# Patient Record
Sex: FEMALE | Race: WHITE | NOT HISPANIC OR LATINO | Employment: UNEMPLOYED | URBAN - METROPOLITAN AREA
[De-identification: names, ages, dates, MRNs, and addresses within clinical notes are randomized per-mention and may not be internally consistent; named-entity substitution may affect disease eponyms.]

---

## 2018-06-11 ENCOUNTER — OFFICE VISIT (OUTPATIENT)
Dept: URGENT CARE | Facility: PHYSICIAN GROUP | Age: 28
End: 2018-06-11
Payer: COMMERCIAL

## 2018-06-11 VITALS
WEIGHT: 180 LBS | DIASTOLIC BLOOD PRESSURE: 78 MMHG | BODY MASS INDEX: 36.29 KG/M2 | HEART RATE: 78 BPM | SYSTOLIC BLOOD PRESSURE: 122 MMHG | RESPIRATION RATE: 18 BRPM | TEMPERATURE: 98.7 F | HEIGHT: 59 IN | OXYGEN SATURATION: 97 %

## 2018-06-11 DIAGNOSIS — J02.9 VIRAL PHARYNGITIS: ICD-10-CM

## 2018-06-11 DIAGNOSIS — J02.9 SORE THROAT: ICD-10-CM

## 2018-06-11 LAB
INT CON NEG: NEGATIVE
INT CON POS: POSITIVE
S PYO AG THROAT QL: NORMAL

## 2018-06-11 PROCEDURE — 87880 STREP A ASSAY W/OPTIC: CPT | Performed by: PHYSICIAN ASSISTANT

## 2018-06-11 PROCEDURE — 99203 OFFICE O/P NEW LOW 30 MIN: CPT | Performed by: PHYSICIAN ASSISTANT

## 2018-06-11 ASSESSMENT — ENCOUNTER SYMPTOMS
TROUBLE SWALLOWING: 1
FEVER: 1
MUSCULOSKELETAL NEGATIVE: 1
GASTROINTESTINAL NEGATIVE: 1
SWOLLEN GLANDS: 1
DIZZINESS: 0
CHILLS: 0
HEADACHES: 1
SHORTNESS OF BREATH: 0
WHEEZING: 0
COUGH: 0
SORE THROAT: 1
CARDIOVASCULAR NEGATIVE: 1

## 2018-06-11 NOTE — PROGRESS NOTES
"Subjective:      Liz Rider is a 27 y.o. female who presents with Pharyngitis (x3 days )            Pharyngitis    This is a new problem. The current episode started in the past 7 days. The problem has been gradually worsening. The maximum temperature recorded prior to her arrival was 101 - 101.9 F. The fever has been present for 1 to 2 days. Associated symptoms include congestion, ear pain, headaches, swollen glands and trouble swallowing. Pertinent negatives include no coughing or shortness of breath. She has tried NSAIDs for the symptoms. The treatment provided mild relief.       PMH:  has a past medical history of Psoriasis.  MEDS:   Current Outpatient Prescriptions:   •  Levonorgestrel-Ethinyl Estrad (LUTERA PO), Take  by mouth., Disp: , Rfl:   •  TRIAMCINOLONE ACETONIDE, TOP, 0.05 % OINT, 1 Application by Apply externally route 2 Times a Day., Disp: 1 g, Rfl: 3  ALLERGIES: No Known Allergies  SURGHX:   Past Surgical History:   Procedure Laterality Date   • OPEN REDUCTION       SOCHX:  reports that she has been smoking Cigarettes.  She has never used smokeless tobacco. She reports that she drinks alcohol. She reports that she does not use drugs.  FH: family history is not on file.    Review of Systems   Constitutional: Positive for fever. Negative for chills.   HENT: Positive for congestion, ear pain, sore throat and trouble swallowing.    Respiratory: Negative for cough, shortness of breath and wheezing.    Cardiovascular: Negative.    Gastrointestinal: Negative.    Musculoskeletal: Negative.    Neurological: Positive for headaches. Negative for dizziness.       Medications, Allergies, and current problem list reviewed today in Epic     Objective:     /78   Pulse 78   Temp 37.1 °C (98.7 °F)   Resp 18   Ht 1.499 m (4' 11\")   Wt 81.6 kg (180 lb)   SpO2 97%   BMI 36.36 kg/m²      Physical Exam   Constitutional: She is oriented to person, place, and time. She appears well-developed and " well-nourished. No distress.   HENT:   Head: Normocephalic and atraumatic.   Right Ear: Tympanic membrane and external ear normal.   Left Ear: Tympanic membrane and external ear normal.   Nose: Nose normal.   Mouth/Throat: Oropharynx is clear and moist. No oropharyngeal exudate.   Eyes: Conjunctivae and EOM are normal. Pupils are equal, round, and reactive to light. Right eye exhibits no discharge. Left eye exhibits no discharge.   Neck: Normal range of motion. Neck supple.   Cardiovascular: Normal rate, regular rhythm and normal heart sounds.    Pulmonary/Chest: Effort normal and breath sounds normal. No respiratory distress. She has no wheezes.   Musculoskeletal: Normal range of motion.   Lymphadenopathy:     She has no cervical adenopathy.   Neurological: She is alert and oriented to person, place, and time.   Skin: Skin is warm and dry. She is not diaphoretic.   Psychiatric: She has a normal mood and affect. Her behavior is normal. Judgment and thought content normal.   Nursing note and vitals reviewed.              Assessment/Plan:     1. Sore throat  POCT Rapid Strep A   2. Viral pharyngitis  lidocaine viscous 2% (XYLOCAINE) 2 % Solution     Rapid strep: Negative  No signs of bacterial infection. Vitals normal, rapid strep negative. Likely a viral illness.  Symptomatic relief with viscous lidocaine  OTC meds and conservative measures as discussed  Return to clinic or go to ED if symptoms worsen or persist. Indications for ED discussed at length. Patient voices understanding. Follow-up with your primary care provider in 3-5 days. Red flags discussed. All side effects of medication discussed including allergic response, GI upset, tendon injury, etc.    Please note that this dictation was created using voice recognition software. I have made every reasonable attempt to correct obvious errors, but I expect that there are errors of grammar and possibly content that I did not discover before finalizing the note.

## 2018-12-29 ENCOUNTER — OFFICE VISIT (OUTPATIENT)
Dept: URGENT CARE | Facility: PHYSICIAN GROUP | Age: 28
End: 2018-12-29
Payer: COMMERCIAL

## 2018-12-29 ENCOUNTER — HOSPITAL ENCOUNTER (OUTPATIENT)
Facility: MEDICAL CENTER | Age: 28
End: 2018-12-29
Attending: PHYSICIAN ASSISTANT
Payer: COMMERCIAL

## 2018-12-29 VITALS
OXYGEN SATURATION: 96 % | BODY MASS INDEX: 37.01 KG/M2 | DIASTOLIC BLOOD PRESSURE: 76 MMHG | WEIGHT: 183.6 LBS | SYSTOLIC BLOOD PRESSURE: 128 MMHG | HEART RATE: 80 BPM | TEMPERATURE: 98.5 F | HEIGHT: 59 IN

## 2018-12-29 DIAGNOSIS — N61.0 CELLULITIS OF RIGHT BREAST: ICD-10-CM

## 2018-12-29 PROCEDURE — 99214 OFFICE O/P EST MOD 30 MIN: CPT | Performed by: FAMILY MEDICINE

## 2018-12-29 PROCEDURE — 87205 SMEAR GRAM STAIN: CPT

## 2018-12-29 PROCEDURE — 87070 CULTURE OTHR SPECIMN AEROBIC: CPT

## 2018-12-29 RX ORDER — SULFAMETHOXAZOLE AND TRIMETHOPRIM 800; 160 MG/1; MG/1
1 TABLET ORAL 2 TIMES DAILY
Qty: 20 TAB | Refills: 0 | Status: SHIPPED | OUTPATIENT
Start: 2018-12-29 | End: 2019-01-08

## 2018-12-29 ASSESSMENT — ENCOUNTER SYMPTOMS
HEADACHES: 0
DIAPHORESIS: 0
FEVER: 0
MYALGIAS: 0
CONSTIPATION: 0
CHILLS: 0
DIARRHEA: 0
DIZZINESS: 0
NAUSEA: 0
VOMITING: 0
ABDOMINAL PAIN: 0
SHORTNESS OF BREATH: 0
FLANK PAIN: 0

## 2018-12-29 NOTE — PROGRESS NOTES
Subjective:   Liz Rider is a 28 y.o. female who presents for Nipple Problem (lump, tender, bruising, painful to the touch, discharge (clear/yellow), x4 days)       Patient presents today with right breast swelling and redness for 4-5 days. She states that she had her nipples pierced in April and had no complications. Within the last 4-5 days, she has developed redness, swelling, pain, and yellow discharge from the piercing site. She denies fever, chills, nausea, vomiting, diarrhea. She is having difficulty sleeping at night because any pressure to the area causes pain.       Review of Systems   Constitutional: Negative for chills, diaphoresis and fever.   Respiratory: Negative for shortness of breath.    Cardiovascular: Negative for chest pain.   Gastrointestinal: Negative for abdominal pain, constipation, diarrhea, nausea and vomiting.   Genitourinary: Negative for dysuria, flank pain, frequency and urgency.   Musculoskeletal: Negative for joint pain and myalgias.   Skin:        Redness, swelling, pain, discharge of right breast.   Neurological: Negative for dizziness and headaches.       PMH:  has a past medical history of Psoriasis.    MEDS:   Current Outpatient Prescriptions:   •  sulfamethoxazole-trimethoprim (BACTRIM DS) 800-160 MG tablet, Take 1 Tab by mouth 2 times a day for 10 days., Disp: 20 Tab, Rfl: 0  •  lidocaine viscous 2% (XYLOCAINE) 2 % Solution, Take 5 mL by mouth 3 times a day as needed for Throat/Mouth Pain., Disp: 100 mL, Rfl: 0  •  Levonorgestrel-Ethinyl Estrad (LUTERA PO), Take  by mouth., Disp: , Rfl:   •  TRIAMCINOLONE ACETONIDE, TOP, 0.05 % OINT, 1 Application by Apply externally route 2 Times a Day., Disp: 1 g, Rfl: 3    ALLERGIES: No Known Allergies    SURGHX:   Past Surgical History:   Procedure Laterality Date   • OPEN REDUCTION         SOCHX:  reports that she has been smoking Cigarettes.  She has never used smokeless tobacco. She reports that she drinks alcohol. She reports  "that she does not use drugs.    FH: Reviewed with patient, not pertinent to this visit.     Objective:   /76 (BP Location: Right arm, Patient Position: Sitting, BP Cuff Size: Adult)   Pulse 80   Temp 36.9 °C (98.5 °F) (Temporal)   Ht 1.499 m (4' 11\")   Wt 83.3 kg (183 lb 9.6 oz)   SpO2 96%   BMI 37.08 kg/m²   Physical Exam   Constitutional: She is oriented to person, place, and time. She appears well-developed and well-nourished. No distress.   HENT:   Head: Normocephalic and atraumatic.   Right Ear: External ear normal.   Left Ear: External ear normal.   Nose: Nose normal.   Eyes: Conjunctivae and EOM are normal.   Neck: No tracheal deviation present.   Cardiovascular: Normal rate, regular rhythm and normal heart sounds.    Pulmonary/Chest: Effort normal and breath sounds normal. No respiratory distress.   Neurological: She is alert and oriented to person, place, and time.   Skin: Skin is warm and dry.   Erythema, warmth, edema of right breast around nipple. Scant yellow discharge present along piercing. Tender to palpation.   Psychiatric: She has a normal mood and affect. Her behavior is normal. Judgment and thought content normal.       Assessment/Plan:   1. Cellulitis of right breast  - sulfamethoxazole-trimethoprim (BACTRIM DS) 800-160 MG tablet; Take 1 Tab by mouth 2 times a day for 10 days.  Dispense: 20 Tab; Refill: 0  - CULTURE WOUND W/ GRAM STAIN; Future  - Advised to take abx with food, yogurt and to complete course  - Advised to take OTC Ibuprofen/Acetaminophen prn  - Will call patient with culture results and treatment change if indicated  - Advised to return/go to ED if symptoms of infection worsen or do not improve    Differential diagnosis, natural history, supportive care, and indications for immediate follow-up discussed.  Patient's case was reviewed and discussed with Dr. Queen during Nabil BORDEN's training period.    "

## 2018-12-30 LAB
GRAM STN SPEC: NORMAL
SIGNIFICANT IND 70042: NORMAL
SITE SITE: NORMAL
SOURCE SOURCE: NORMAL

## 2018-12-31 LAB
BACTERIA WND AEROBE CULT: NORMAL
GRAM STN SPEC: NORMAL
SIGNIFICANT IND 70042: NORMAL
SITE SITE: NORMAL
SOURCE SOURCE: NORMAL

## 2019-01-11 ENCOUNTER — OFFICE VISIT (OUTPATIENT)
Dept: URGENT CARE | Facility: PHYSICIAN GROUP | Age: 29
End: 2019-01-11
Payer: COMMERCIAL

## 2019-01-11 VITALS
HEIGHT: 59 IN | BODY MASS INDEX: 36.29 KG/M2 | WEIGHT: 180 LBS | DIASTOLIC BLOOD PRESSURE: 72 MMHG | TEMPERATURE: 98.1 F | SYSTOLIC BLOOD PRESSURE: 128 MMHG | HEART RATE: 68 BPM | RESPIRATION RATE: 16 BRPM | OXYGEN SATURATION: 99 %

## 2019-01-11 DIAGNOSIS — N63.0 BREAST MASS IN FEMALE: Primary | ICD-10-CM

## 2019-01-11 LAB
INT CON NEG: NEGATIVE
INT CON POS: POSITIVE
POC URINE PREGNANCY TEST: NEGATIVE

## 2019-01-11 PROCEDURE — 81025 URINE PREGNANCY TEST: CPT | Performed by: PHYSICIAN ASSISTANT

## 2019-01-11 PROCEDURE — 99214 OFFICE O/P EST MOD 30 MIN: CPT | Performed by: PHYSICIAN ASSISTANT

## 2019-01-11 ASSESSMENT — ENCOUNTER SYMPTOMS
CONSTIPATION: 0
COUGH: 0
FATIGUE: 0
BREAST PAIN: 1
SHORTNESS OF BREATH: 0
ABDOMINAL PAIN: 0
ANOREXIA: 0
VOMITING: 0
BACK PAIN: 0
CHILLS: 0
DIARRHEA: 0
CHANGE IN BOWEL HABIT: 0
FEVER: 0
SWOLLEN GLANDS: 0
NAUSEA: 0

## 2019-01-11 NOTE — PROGRESS NOTES
Subjective:   Liz Rider is a 28 y.o. female who presents for Breast Pain (bump on right breast, painful )        Breast Pain   This is a new problem. Episode onset: 1 month. The problem occurs constantly. The problem has been unchanged. Pertinent negatives include no abdominal pain, anorexia, change in bowel habit, chills, congestion, coughing, fatigue, fever, nausea, swollen glands, urinary symptoms or vomiting. Nothing aggravates the symptoms. She has tried NSAIDs for the symptoms. The treatment provided mild relief.     Pt is not currently pregnant. No hx of PCOs. . Not currently on BCP. Currently sexually active.     LMP: 1/3/19, normal. Pain did not change with menses.     Pt was seen on 18, tx for cellulitis with 10 day course of bactrim. Sx improved. Sx recurred 1 day after completed course of antibiotic.  Redness in area has resolved.    Review of Systems   Constitutional: Negative for chills, fatigue, fever and malaise/fatigue.   HENT: Negative for congestion.    Respiratory: Negative for cough and shortness of breath.    Gastrointestinal: Negative for abdominal pain, anorexia, change in bowel habit, constipation, diarrhea, nausea and vomiting.   Musculoskeletal: Negative for back pain and joint pain.   All other systems reviewed and are negative.      PMH:  has a past medical history of Psoriasis.  MEDS:   Current Outpatient Prescriptions:   •  lidocaine viscous 2% (XYLOCAINE) 2 % Solution, Take 5 mL by mouth 3 times a day as needed for Throat/Mouth Pain., Disp: 100 mL, Rfl: 0  •  Levonorgestrel-Ethinyl Estrad (LUTERA PO), Take  by mouth., Disp: , Rfl:   •  TRIAMCINOLONE ACETONIDE, TOP, 0.05 % OINT, 1 Application by Apply externally route 2 Times a Day., Disp: 1 g, Rfl: 3  ALLERGIES: No Known Allergies  SURGHX:   Past Surgical History:   Procedure Laterality Date   • OPEN REDUCTION       SOCHX:  reports that she has quit smoking. Her smoking use included Cigarettes. She has never used  "smokeless tobacco. She reports that she drinks alcohol. She reports that she does not use drugs.  No family history on file.     Objective:   /72   Pulse 68   Temp 36.7 °C (98.1 °F) (Temporal)   Resp 16   Ht 1.499 m (4' 11\")   Wt 81.6 kg (180 lb)   SpO2 99%   BMI 36.36 kg/m²     Physical Exam   Constitutional: She is oriented to person, place, and time. She appears well-developed and well-nourished. No distress.   HENT:   Head: Normocephalic and atraumatic.   Eyes: Pupils are equal, round, and reactive to light. Conjunctivae are normal.   Cardiovascular: Normal rate and regular rhythm.    Pulmonary/Chest: Effort normal and breath sounds normal.       Neurological: She is alert and oriented to person, place, and time.   Skin: Skin is warm and dry.   Psychiatric: She has a normal mood and affect. Her behavior is normal.   Vitals reviewed.    HCG negative      Assessment/Plan:     1. Breast mass in female  US-BREAST COMPLETE-RIGHT    POCT Pregnancy    REFERRAL TO FOLLOW-UP WITH PRIMARY CARE    REFERRAL TO GYNECOLOGY     Patient will be notified of final ultrasound results.  Continue to monitor area closely.  She can continue ibuprofen as 100-800 mg as needed for pain.  We discussed removing metal nipple ring.     Follow-up with primary care provider within 7-10 days, referral placed.  If symptoms worsen or persist patient can contact me or return to clinic for follow-up.    A thorough review of red flags and strict emergency room precautions discussed.  Patient verbalizes understanding of information.       "

## 2019-01-14 ENCOUNTER — HOSPITAL ENCOUNTER (OUTPATIENT)
Dept: RADIOLOGY | Facility: MEDICAL CENTER | Age: 29
End: 2019-01-14
Attending: PHYSICIAN ASSISTANT
Payer: COMMERCIAL

## 2019-01-14 DIAGNOSIS — N63.0 BREAST MASS IN FEMALE: ICD-10-CM

## 2019-01-14 PROCEDURE — 76642 ULTRASOUND BREAST LIMITED: CPT | Mod: RT

## 2019-01-14 NOTE — PROGRESS NOTES
Covering for Jayna who is not working today.  Received a call from radiologist Dr. Olmos regarding breast ultrasound results.  Appears to be a abscess of the breast.  She is recommending a referral to the breast center for surgical consult.  I will placed that referral now.  Results were discussed with patient at time of consultation.    Impression       4.7 cm maximal diameter complex fluid and solid echo collection in the right retroareolar region which given the patient history has an appearance most consistent with abscess or phlegmon. No large easily drainable fluid collection is appreciated.   Surgical referral is recommended to better determine whether the area needs to be excised.    These results and need for surgical referral were discussed with Melvi from [facility] at 1540 hours on 1/14/2019.  These results were given to the patient at the time of visit.    Category 4A - Finding needing surgical consultation but with a low suspicion for malignancy.     1. Breast mass in female  REFERRAL TO BREAST CLINIC         Rex Rider P.A.-C.

## 2019-01-15 DIAGNOSIS — N63.0 BREAST MASS: ICD-10-CM

## 2019-01-18 ENCOUNTER — HOSPITAL ENCOUNTER (INPATIENT)
Dept: HOSPITAL 8 - OUT | Age: 29
LOS: 3 days | Discharge: HOME | DRG: 585 | End: 2019-01-21
Attending: SURGERY | Admitting: SURGERY
Payer: COMMERCIAL

## 2019-01-18 VITALS — SYSTOLIC BLOOD PRESSURE: 122 MMHG | DIASTOLIC BLOOD PRESSURE: 66 MMHG

## 2019-01-18 VITALS — WEIGHT: 190.04 LBS | HEIGHT: 59 IN | BODY MASS INDEX: 38.31 KG/M2

## 2019-01-18 VITALS — SYSTOLIC BLOOD PRESSURE: 100 MMHG | DIASTOLIC BLOOD PRESSURE: 51 MMHG

## 2019-01-18 VITALS — SYSTOLIC BLOOD PRESSURE: 122 MMHG | DIASTOLIC BLOOD PRESSURE: 70 MMHG

## 2019-01-18 DIAGNOSIS — N61.1: Primary | ICD-10-CM

## 2019-01-18 DIAGNOSIS — Z83.3: ICD-10-CM

## 2019-01-18 LAB — HCG UR SG: 1.01 (ref 1–1.03)

## 2019-01-18 PROCEDURE — 0HBT0ZZ EXCISION OF RIGHT BREAST, OPEN APPROACH: ICD-10-PCS | Performed by: SURGERY

## 2019-01-18 PROCEDURE — 88305 TISSUE EXAM BY PATHOLOGIST: CPT

## 2019-01-18 PROCEDURE — 87070 CULTURE OTHR SPECIMN AEROBIC: CPT

## 2019-01-18 PROCEDURE — 87181 SC STD AGAR DILUTION PER AGT: CPT

## 2019-01-18 PROCEDURE — 87075 CULTR BACTERIA EXCEPT BLOOD: CPT

## 2019-01-18 PROCEDURE — 81025 URINE PREGNANCY TEST: CPT

## 2019-01-18 PROCEDURE — 87205 SMEAR GRAM STAIN: CPT

## 2019-01-18 RX ADMIN — HYDROMORPHONE HYDROCHLORIDE PRN MG: 2 INJECTION INTRAMUSCULAR; INTRAVENOUS; SUBCUTANEOUS at 15:13

## 2019-01-18 RX ADMIN — FENTANYL CITRATE PRN MCG: 50 INJECTION INTRAMUSCULAR; INTRAVENOUS at 14:57

## 2019-01-18 RX ADMIN — SODIUM CHLORIDE, PRESERVATIVE FREE SCH ML: 5 INJECTION INTRAVENOUS at 21:00

## 2019-01-18 RX ADMIN — MORPHINE SULFATE PRN MG: 10 INJECTION INTRAVENOUS at 16:59

## 2019-01-18 RX ADMIN — HYDROMORPHONE HYDROCHLORIDE PRN MG: 2 INJECTION INTRAMUSCULAR; INTRAVENOUS; SUBCUTANEOUS at 15:00

## 2019-01-18 RX ADMIN — OXYCODONE HYDROCHLORIDE AND ACETAMINOPHEN PRN TAB: 5; 325 TABLET ORAL at 19:30

## 2019-01-18 RX ADMIN — MORPHINE SULFATE PRN MG: 10 INJECTION INTRAVENOUS at 17:22

## 2019-01-18 RX ADMIN — FENTANYL CITRATE PRN MCG: 50 INJECTION INTRAMUSCULAR; INTRAVENOUS at 14:48

## 2019-01-18 RX ADMIN — DOCUSATE SODIUM SCH MG: 100 CAPSULE, LIQUID FILLED ORAL at 21:32

## 2019-01-18 RX ADMIN — AMOXICILLIN AND CLAVULANATE POTASSIUM SCH TAB: 875; 125 TABLET, FILM COATED ORAL at 17:22

## 2019-01-19 VITALS — DIASTOLIC BLOOD PRESSURE: 50 MMHG | SYSTOLIC BLOOD PRESSURE: 93 MMHG

## 2019-01-19 VITALS — DIASTOLIC BLOOD PRESSURE: 73 MMHG | SYSTOLIC BLOOD PRESSURE: 126 MMHG

## 2019-01-19 VITALS — SYSTOLIC BLOOD PRESSURE: 113 MMHG | DIASTOLIC BLOOD PRESSURE: 76 MMHG

## 2019-01-19 VITALS — DIASTOLIC BLOOD PRESSURE: 60 MMHG | SYSTOLIC BLOOD PRESSURE: 109 MMHG

## 2019-01-19 RX ADMIN — SODIUM CHLORIDE, PRESERVATIVE FREE SCH ML: 5 INJECTION INTRAVENOUS at 09:42

## 2019-01-19 RX ADMIN — OXYCODONE HYDROCHLORIDE AND ACETAMINOPHEN PRN TAB: 5; 325 TABLET ORAL at 14:06

## 2019-01-19 RX ADMIN — OXYCODONE HYDROCHLORIDE AND ACETAMINOPHEN PRN TAB: 5; 325 TABLET ORAL at 10:12

## 2019-01-19 RX ADMIN — AMOXICILLIN AND CLAVULANATE POTASSIUM SCH TAB: 875; 125 TABLET, FILM COATED ORAL at 18:03

## 2019-01-19 RX ADMIN — DOCUSATE SODIUM SCH MG: 100 CAPSULE, LIQUID FILLED ORAL at 21:21

## 2019-01-19 RX ADMIN — OXYCODONE HYDROCHLORIDE AND ACETAMINOPHEN PRN TAB: 5; 325 TABLET ORAL at 18:03

## 2019-01-19 RX ADMIN — OXYCODONE HYDROCHLORIDE AND ACETAMINOPHEN PRN TAB: 5; 325 TABLET ORAL at 21:54

## 2019-01-19 RX ADMIN — OXYCODONE HYDROCHLORIDE AND ACETAMINOPHEN PRN TAB: 5; 325 TABLET ORAL at 05:42

## 2019-01-19 RX ADMIN — MORPHINE SULFATE PRN MG: 10 INJECTION INTRAVENOUS at 09:42

## 2019-01-19 RX ADMIN — MORPHINE SULFATE PRN MG: 10 INJECTION INTRAVENOUS at 21:21

## 2019-01-19 RX ADMIN — SODIUM CHLORIDE, PRESERVATIVE FREE SCH ML: 5 INJECTION INTRAVENOUS at 21:21

## 2019-01-19 RX ADMIN — AMOXICILLIN AND CLAVULANATE POTASSIUM SCH TAB: 875; 125 TABLET, FILM COATED ORAL at 05:38

## 2019-01-19 RX ADMIN — OXYCODONE HYDROCHLORIDE AND ACETAMINOPHEN PRN TAB: 5; 325 TABLET ORAL at 01:13

## 2019-01-19 RX ADMIN — DOCUSATE SODIUM SCH MG: 100 CAPSULE, LIQUID FILLED ORAL at 09:42

## 2019-01-19 RX ADMIN — ONDANSETRON PRN MG: 2 INJECTION, SOLUTION INTRAMUSCULAR; INTRAVENOUS at 09:42

## 2019-01-19 RX ADMIN — ONDANSETRON PRN MG: 2 INJECTION, SOLUTION INTRAMUSCULAR; INTRAVENOUS at 21:20

## 2019-01-20 VITALS — DIASTOLIC BLOOD PRESSURE: 71 MMHG | SYSTOLIC BLOOD PRESSURE: 113 MMHG

## 2019-01-20 VITALS — DIASTOLIC BLOOD PRESSURE: 53 MMHG | SYSTOLIC BLOOD PRESSURE: 102 MMHG

## 2019-01-20 VITALS — SYSTOLIC BLOOD PRESSURE: 96 MMHG | DIASTOLIC BLOOD PRESSURE: 65 MMHG

## 2019-01-20 VITALS — SYSTOLIC BLOOD PRESSURE: 107 MMHG | DIASTOLIC BLOOD PRESSURE: 63 MMHG

## 2019-01-20 RX ADMIN — ONDANSETRON PRN MG: 2 INJECTION, SOLUTION INTRAMUSCULAR; INTRAVENOUS at 10:02

## 2019-01-20 RX ADMIN — OXYCODONE HYDROCHLORIDE AND ACETAMINOPHEN PRN TAB: 5; 325 TABLET ORAL at 21:29

## 2019-01-20 RX ADMIN — ONDANSETRON PRN MG: 2 INJECTION, SOLUTION INTRAMUSCULAR; INTRAVENOUS at 22:13

## 2019-01-20 RX ADMIN — AMOXICILLIN AND CLAVULANATE POTASSIUM SCH TAB: 875; 125 TABLET, FILM COATED ORAL at 05:04

## 2019-01-20 RX ADMIN — MORPHINE SULFATE PRN MG: 10 INJECTION INTRAVENOUS at 22:13

## 2019-01-20 RX ADMIN — ONDANSETRON PRN MG: 2 INJECTION, SOLUTION INTRAMUSCULAR; INTRAVENOUS at 16:54

## 2019-01-20 RX ADMIN — DOCUSATE SODIUM SCH MG: 100 CAPSULE, LIQUID FILLED ORAL at 10:02

## 2019-01-20 RX ADMIN — SODIUM CHLORIDE, PRESERVATIVE FREE SCH ML: 5 INJECTION INTRAVENOUS at 21:29

## 2019-01-20 RX ADMIN — MORPHINE SULFATE PRN MG: 10 INJECTION INTRAVENOUS at 10:02

## 2019-01-20 RX ADMIN — AMOXICILLIN AND CLAVULANATE POTASSIUM SCH TAB: 875; 125 TABLET, FILM COATED ORAL at 17:00

## 2019-01-20 RX ADMIN — DOCUSATE SODIUM SCH MG: 100 CAPSULE, LIQUID FILLED ORAL at 21:29

## 2019-01-20 RX ADMIN — SODIUM CHLORIDE, PRESERVATIVE FREE SCH ML: 5 INJECTION INTRAVENOUS at 10:03

## 2019-01-20 RX ADMIN — OXYCODONE HYDROCHLORIDE AND ACETAMINOPHEN PRN TAB: 5; 325 TABLET ORAL at 10:25

## 2019-01-21 VITALS — SYSTOLIC BLOOD PRESSURE: 93 MMHG | DIASTOLIC BLOOD PRESSURE: 52 MMHG

## 2019-01-21 VITALS — DIASTOLIC BLOOD PRESSURE: 57 MMHG | SYSTOLIC BLOOD PRESSURE: 105 MMHG

## 2019-01-21 RX ADMIN — DOCUSATE SODIUM SCH MG: 100 CAPSULE, LIQUID FILLED ORAL at 08:20

## 2019-01-21 RX ADMIN — AMOXICILLIN AND CLAVULANATE POTASSIUM SCH TAB: 875; 125 TABLET, FILM COATED ORAL at 05:37

## 2019-01-21 RX ADMIN — SODIUM CHLORIDE, PRESERVATIVE FREE SCH ML: 5 INJECTION INTRAVENOUS at 08:20

## 2019-01-21 RX ADMIN — OXYCODONE HYDROCHLORIDE AND ACETAMINOPHEN PRN TAB: 5; 325 TABLET ORAL at 09:17

## 2019-06-17 ENCOUNTER — HOSPITAL ENCOUNTER (OUTPATIENT)
Facility: MEDICAL CENTER | Age: 29
End: 2019-06-17
Attending: PHYSICIAN ASSISTANT
Payer: COMMERCIAL

## 2019-06-17 ENCOUNTER — OFFICE VISIT (OUTPATIENT)
Dept: URGENT CARE | Facility: PHYSICIAN GROUP | Age: 29
End: 2019-06-17
Payer: COMMERCIAL

## 2019-06-17 VITALS
DIASTOLIC BLOOD PRESSURE: 70 MMHG | WEIGHT: 190 LBS | OXYGEN SATURATION: 98 % | TEMPERATURE: 98.6 F | HEIGHT: 59 IN | BODY MASS INDEX: 38.3 KG/M2 | HEART RATE: 74 BPM | SYSTOLIC BLOOD PRESSURE: 122 MMHG | RESPIRATION RATE: 16 BRPM

## 2019-06-17 DIAGNOSIS — B97.89 VIRAL SORE THROAT: ICD-10-CM

## 2019-06-17 DIAGNOSIS — J02.8 VIRAL SORE THROAT: ICD-10-CM

## 2019-06-17 DIAGNOSIS — J06.9 VIRAL URI WITH COUGH: ICD-10-CM

## 2019-06-17 LAB
INT CON NEG: NEGATIVE
INT CON POS: POSITIVE
S PYO AG THROAT QL: NORMAL

## 2019-06-17 PROCEDURE — 87880 STREP A ASSAY W/OPTIC: CPT | Performed by: PHYSICIAN ASSISTANT

## 2019-06-17 PROCEDURE — 99213 OFFICE O/P EST LOW 20 MIN: CPT | Performed by: PHYSICIAN ASSISTANT

## 2019-06-17 PROCEDURE — 87070 CULTURE OTHR SPECIMN AEROBIC: CPT

## 2019-06-17 ASSESSMENT — ENCOUNTER SYMPTOMS
FEVER: 0
MYALGIAS: 0
HEADACHES: 0
HOARSE VOICE: 1
VOMITING: 0
TROUBLE SWALLOWING: 1
NAUSEA: 0
EYE REDNESS: 0
EYE DISCHARGE: 0
COUGH: 1
SHORTNESS OF BREATH: 0
ABDOMINAL PAIN: 0
SWOLLEN GLANDS: 1
SORE THROAT: 1

## 2019-06-17 NOTE — PROGRESS NOTES
Subjective:      Liz Rider is a 29 y.o. female who presents with Sore Throat (swollen tonsils, feels like choking on tonsils X 1 week )          Pharyngitis    This is a new problem. Episode onset: x7-8 days. The problem has been unchanged. The pain is worse on the right side. There has been no fever. The pain is moderate. Associated symptoms include congestion (now improved), coughing (now improved), ear pain (right ear), a hoarse voice, swollen glands and trouble swallowing (The patient reports increased pain with swallowing). Pertinent negatives include no abdominal pain, drooling, headaches, shortness of breath or vomiting. She has had no exposure to strep. She has tried NSAIDs (OTC Sore Throat Medication) for the symptoms. The treatment provided mild relief.     The patient presents to clinic c/o sore throat x 7-8 days. The patient states she thought she had a viral cold with associated cough and congestion, however the cough and congestion has since improved. No fever.     PMH:  has a past medical history of Psoriasis.  MEDS:   Current Outpatient Prescriptions:   •  lidocaine viscous 2% (XYLOCAINE) 2 % Solution, Take 5 mL by mouth 3 times a day as needed for Throat/Mouth Pain., Disp: 100 mL, Rfl: 0  •  Levonorgestrel-Ethinyl Estrad (LUTERA PO), Take  by mouth., Disp: , Rfl:   •  TRIAMCINOLONE ACETONIDE, TOP, 0.05 % OINT, 1 Application by Apply externally route 2 Times a Day., Disp: 1 g, Rfl: 3  ALLERGIES: No Known Allergies  SURGHX:   Past Surgical History:   Procedure Laterality Date   • OPEN REDUCTION       SOCHX:  reports that she has quit smoking. Her smoking use included Cigarettes. She has never used smokeless tobacco. She reports that she drinks alcohol. She reports that she does not use drugs.  FH: Family history was reviewed, no pertinent findings to report    Review of Systems   Constitutional: Negative for fever.   HENT: Positive for congestion (now improved), ear pain (right ear), hoarse  "voice, sore throat and trouble swallowing (The patient reports increased pain with swallowing). Negative for drooling.    Eyes: Negative for discharge and redness.   Respiratory: Positive for cough (now improved). Negative for shortness of breath.    Cardiovascular: Negative for chest pain and leg swelling.   Gastrointestinal: Negative for abdominal pain, nausea and vomiting.   Genitourinary: Negative for dysuria.   Musculoskeletal: Negative for joint pain and myalgias.   Skin: Negative for rash.   Neurological: Negative for headaches.          Objective:     /70   Pulse 74   Temp 37 °C (98.6 °F)   Resp 16   Ht 1.499 m (4' 11\")   Wt 86.2 kg (190 lb)   SpO2 98%   BMI 38.38 kg/m²      Physical Exam   Constitutional: She is oriented to person, place, and time. She appears well-developed and well-nourished. No distress.   HENT:   Head: Normocephalic and atraumatic.   Right Ear: Tympanic membrane, external ear and ear canal normal.   Left Ear: Tympanic membrane, external ear and ear canal normal.   Nose: Nose normal.   Mouth/Throat: Mucous membranes are normal. Posterior oropharyngeal erythema present. Tonsils are 1+ on the right. Tonsils are 1+ on the left. No tonsillar exudate.   Eyes: Conjunctivae and EOM are normal.   Neck: Normal range of motion. Neck supple.   Cardiovascular: Normal rate, regular rhythm and normal heart sounds.    Pulmonary/Chest: Effort normal and breath sounds normal.   Musculoskeletal: Normal range of motion. She exhibits no edema.   Neurological: She is alert and oriented to person, place, and time.   Skin: Skin is warm and dry.          Progress:  POCT Rapid Strep: Negative    Throat Culture - pending     Assessment/Plan:     1. Viral sore throat    - POCT Rapid Strep A  - CULTURE THROAT; Future    2. Viral URI with cough    The patient's presenting symptoms and physical exam are consistent with a viral sore throat and a viral URI with associated cough. Her rapid strep test was " negative today in clinic, indicating her symptoms are unlikely due to strep pharyngitis. Will culture the patent's throat to rule out other bacterial sources. Given the patient is currently not experiencing a productive cough, fever, or shortness of breath, and the presence of clear lung sounds and a normal POX on physical exam, it is unlikely her symptoms are due to a lower respiratory infection. Recommend OTC medications and supportive care for symptomatic relief.  Discussed return precautions with the patient, and she verbalized understanding.      OTC Tylenol or Motrin for fever/discomfort.  OTC cough/cold medications for symptomatic relief  OTC Supportive Care for Sore Throat - warm salt water gargles, sore throat lozenges, warm lemon water, and/or tea.  Drink plenty of fluids  Follow-up with PCP as needed   Return to clinic or go to the ED if symptoms worsen or fail to improve, or if patient develops worsening/increasing sore throat, difficultly handling secretion, change in voice, ear pain, cough, congestion, shortness of breath, fever/chils, and/or any concerning symptoms.     Discussed plan with the patient, and she agrees to the above.

## 2019-06-20 LAB
BACTERIA SPEC RESP CULT: NORMAL
SIGNIFICANT IND 70042: NORMAL
SITE SITE: NORMAL
SOURCE SOURCE: NORMAL

## 2019-09-03 ENCOUNTER — HOSPITAL ENCOUNTER (EMERGENCY)
Facility: MEDICAL CENTER | Age: 29
End: 2019-09-03
Attending: EMERGENCY MEDICINE
Payer: MEDICAID

## 2019-09-03 ENCOUNTER — APPOINTMENT (OUTPATIENT)
Dept: RADIOLOGY | Facility: MEDICAL CENTER | Age: 29
End: 2019-09-03
Attending: EMERGENCY MEDICINE
Payer: MEDICAID

## 2019-09-03 ENCOUNTER — OFFICE VISIT (OUTPATIENT)
Dept: URGENT CARE | Facility: CLINIC | Age: 29
End: 2019-09-03
Payer: MEDICAID

## 2019-09-03 VITALS
OXYGEN SATURATION: 94 % | TEMPERATURE: 96.6 F | BODY MASS INDEX: 39.11 KG/M2 | HEIGHT: 59 IN | WEIGHT: 194 LBS | RESPIRATION RATE: 16 BRPM | SYSTOLIC BLOOD PRESSURE: 115 MMHG | HEART RATE: 66 BPM | DIASTOLIC BLOOD PRESSURE: 61 MMHG

## 2019-09-03 VITALS
SYSTOLIC BLOOD PRESSURE: 118 MMHG | HEIGHT: 59 IN | WEIGHT: 190 LBS | BODY MASS INDEX: 38.3 KG/M2 | HEART RATE: 87 BPM | RESPIRATION RATE: 18 BRPM | OXYGEN SATURATION: 98 % | TEMPERATURE: 99.1 F | DIASTOLIC BLOOD PRESSURE: 76 MMHG

## 2019-09-03 DIAGNOSIS — R22.1 NECK MASS: Primary | ICD-10-CM

## 2019-09-03 DIAGNOSIS — L02.11 ABSCESS, NECK: ICD-10-CM

## 2019-09-03 LAB
ANION GAP SERPL CALC-SCNC: 10 MMOL/L (ref 0–11.9)
BASOPHILS # BLD AUTO: 0.5 % (ref 0–1.8)
BASOPHILS # BLD: 0.06 K/UL (ref 0–0.12)
BUN SERPL-MCNC: 9 MG/DL (ref 8–22)
CALCIUM SERPL-MCNC: 9.8 MG/DL (ref 8.5–10.5)
CHLORIDE SERPL-SCNC: 104 MMOL/L (ref 96–112)
CO2 SERPL-SCNC: 25 MMOL/L (ref 20–33)
CREAT SERPL-MCNC: 0.74 MG/DL (ref 0.5–1.4)
EOSINOPHIL # BLD AUTO: 0.21 K/UL (ref 0–0.51)
EOSINOPHIL NFR BLD: 1.7 % (ref 0–6.9)
ERYTHROCYTE [DISTWIDTH] IN BLOOD BY AUTOMATED COUNT: 41.3 FL (ref 35.9–50)
GLUCOSE SERPL-MCNC: 89 MG/DL (ref 65–99)
GRAM STN SPEC: NORMAL
HCG SERPL QL: NEGATIVE
HCT VFR BLD AUTO: 43.3 % (ref 37–47)
HGB BLD-MCNC: 14.3 G/DL (ref 12–16)
IMM GRANULOCYTES # BLD AUTO: 0.05 K/UL (ref 0–0.11)
IMM GRANULOCYTES NFR BLD AUTO: 0.4 % (ref 0–0.9)
LYMPHOCYTES # BLD AUTO: 1.75 K/UL (ref 1–4.8)
LYMPHOCYTES NFR BLD: 14.4 % (ref 22–41)
MCH RBC QN AUTO: 29.2 PG (ref 27–33)
MCHC RBC AUTO-ENTMCNC: 33 G/DL (ref 33.6–35)
MCV RBC AUTO: 88.4 FL (ref 81.4–97.8)
MONOCYTES # BLD AUTO: 0.79 K/UL (ref 0–0.85)
MONOCYTES NFR BLD AUTO: 6.5 % (ref 0–13.4)
NEUTROPHILS # BLD AUTO: 9.29 K/UL (ref 2–7.15)
NEUTROPHILS NFR BLD: 76.5 % (ref 44–72)
NRBC # BLD AUTO: 0 K/UL
NRBC BLD-RTO: 0 /100 WBC
PLATELET # BLD AUTO: 290 K/UL (ref 164–446)
PMV BLD AUTO: 9.2 FL (ref 9–12.9)
POTASSIUM SERPL-SCNC: 4.1 MMOL/L (ref 3.6–5.5)
RBC # BLD AUTO: 4.9 M/UL (ref 4.2–5.4)
SIGNIFICANT IND 70042: NORMAL
SITE SITE: NORMAL
SODIUM SERPL-SCNC: 139 MMOL/L (ref 135–145)
SOURCE SOURCE: NORMAL
WBC # BLD AUTO: 12.2 K/UL (ref 4.8–10.8)

## 2019-09-03 PROCEDURE — 700101 HCHG RX REV CODE 250: Performed by: EMERGENCY MEDICINE

## 2019-09-03 PROCEDURE — 84703 CHORIONIC GONADOTROPIN ASSAY: CPT

## 2019-09-03 PROCEDURE — 85025 COMPLETE CBC W/AUTO DIFF WBC: CPT

## 2019-09-03 PROCEDURE — 700117 HCHG RX CONTRAST REV CODE 255: Performed by: EMERGENCY MEDICINE

## 2019-09-03 PROCEDURE — 70491 CT SOFT TISSUE NECK W/DYE: CPT

## 2019-09-03 PROCEDURE — 60300 ASPIR/INJ THYROID CYST: CPT

## 2019-09-03 PROCEDURE — 87205 SMEAR GRAM STAIN: CPT

## 2019-09-03 PROCEDURE — 99284 EMERGENCY DEPT VISIT MOD MDM: CPT

## 2019-09-03 PROCEDURE — 10160 PNXR ASPIR ABSC HMTMA BULLA: CPT

## 2019-09-03 PROCEDURE — 96365 THER/PROPH/DIAG IV INF INIT: CPT | Mod: XU

## 2019-09-03 PROCEDURE — 87040 BLOOD CULTURE FOR BACTERIA: CPT

## 2019-09-03 PROCEDURE — 99214 OFFICE O/P EST MOD 30 MIN: CPT | Performed by: PHYSICIAN ASSISTANT

## 2019-09-03 PROCEDURE — 87070 CULTURE OTHR SPECIMN AEROBIC: CPT | Mod: XU

## 2019-09-03 PROCEDURE — 700105 HCHG RX REV CODE 258: Performed by: EMERGENCY MEDICINE

## 2019-09-03 PROCEDURE — 700111 HCHG RX REV CODE 636 W/ 250 OVERRIDE (IP): Performed by: EMERGENCY MEDICINE

## 2019-09-03 PROCEDURE — 80048 BASIC METABOLIC PNL TOTAL CA: CPT

## 2019-09-03 RX ORDER — LIDOCAINE HYDROCHLORIDE AND EPINEPHRINE 10; 10 MG/ML; UG/ML
10 INJECTION, SOLUTION INFILTRATION; PERINEURAL ONCE
Status: COMPLETED | OUTPATIENT
Start: 2019-09-03 | End: 2019-09-03

## 2019-09-03 RX ORDER — AMOXICILLIN AND CLAVULANATE POTASSIUM 875; 125 MG/1; MG/1
1 TABLET, FILM COATED ORAL 2 TIMES DAILY
Qty: 20 TAB | Refills: 0 | Status: SHIPPED | OUTPATIENT
Start: 2019-09-03 | End: 2019-09-13

## 2019-09-03 RX ADMIN — LIDOCAINE HYDROCHLORIDE,EPINEPHRINE BITARTRATE 10 ML: 10; .01 INJECTION, SOLUTION INFILTRATION; PERINEURAL at 19:30

## 2019-09-03 RX ADMIN — SODIUM CHLORIDE 3 G: 900 INJECTION INTRAVENOUS at 18:14

## 2019-09-03 RX ADMIN — IOHEXOL 80 ML: 350 INJECTION, SOLUTION INTRAVENOUS at 17:45

## 2019-09-03 NOTE — PATIENT INSTRUCTIONS
Thyroglossal Cyst  A thyroglossal cyst is an abnormal fluid-filled sac in the front of the neck that is present at birth (congenital). It forms from tissue left over after the thyroid gland develops in the lower front part of the neck. Thyroglossal cysts are common and they often become infected.  What are the causes?  A thyroglossal cyst develops when open space in the neck fills with fluid or a thick mucus-like substance. This often happens after an upper respiratory infection. The open space is from a tube in the neck (thyroglossal duct) that is supposed to close and disappear before birth. Sometimes, the thyroglossal duct does not close, and a thyroglossal cyst develops.  What are the signs or symptoms?  Symptoms of this condition may include:  · A round lump in the front of the neck. The lump may be soft or hard, and it may move up and down when you swallow or stick out your tongue.  · Pain, redness, or swelling around the lump. These may be signs of infection.  · Difficulty swallowing or breathing. This is often present with large cysts.  How is this diagnosed?  This condition is diagnosed with a physical exam. Your health care provider may feel your throat and ask you to swallow and stick out your tongue. You may have tests, including:  · Blood tests.  · CT scan.  · MRI.  · Ultrasound. This uses sound waves to make an image of the cyst and surrounding area.  How is this treated?  Treatment for this condition depends on the severity of your symptoms. Treatment may include:  · Antibiotic medicine to treat an infection or shrink the size of the cyst.  · Surgery to remove the cyst. This may be done if you have infections that come back often (recur), or if the cyst:  ¨ Becomes infected.  ¨ Is large.  ¨ Causes difficulty breathing or swallowing.  ¨ Causes constant pain.  ¨ Affects your appearance.  Follow these instructions at home:  · Pay attention to any new symptoms or changes in your condition.  · Check your  cyst and the surrounding area every day for signs of infection. Check for:  ¨ More redness, swelling, or pain.  ¨ Warmth.  ¨ Increased size.  · Take over-the-counter and prescription medicines only as told by your health care provider.  · If you were prescribed an antibiotic medicine, take it as told by your health care provider. Do not stop taking the antibiotic even if you start to feel better.  · Do not use any tobacco products, such as cigarettes, chewing tobacco, or e-cigarettes. If you need help quitting, ask your health care provider.  · Keep all follow-up visits as told by your health care provider. This is important.  Contact a health care provider if:  · You have more redness, swelling, or pain around your cyst.  · Your cyst feels warm to the touch.  · Your cyst gets bigger.  · You have difficulty swallowing.  Get help right away if:  · You have difficulty breathing.  · You cannot swallow.  · You have severe pain.  This information is not intended to replace advice given to you by your health care provider. Make sure you discuss any questions you have with your health care provider.  Document Released: 04/13/2012 Document Revised: 08/20/2017 Document Reviewed: 10/23/2016  [employer] Interactive Patient Education © 2017 [employer]

## 2019-09-03 NOTE — ED TRIAGE NOTES
"Chief Complaint   Patient presents with   • Sent from Urgent Care     Pt has neck mass x2 weeks, woke today and it had increased in size. Mild pain with touch and movement. Airway intact, able to handle own secretions. Pt reports hx of abscess.     /84   Pulse 71   Temp 35.9 °C (96.6 °F) (Temporal)   Resp 18   Ht 1.499 m (4' 11\")   Wt 88 kg (194 lb 0.1 oz)   SpO2 96%   Breastfeeding? No   BMI 39.18 kg/m²     Pt ambulated into triage, VS as above, NAD, encouraged to return to the triage nurse or tech with any new complaints or symptoms. Charge notified.  "

## 2019-09-03 NOTE — ED PROVIDER NOTES
ED Provider Note    Scribed for Ariela Reaves M.D. by Diego Fortune. 9/3/2019  3:45 PM    Primary care provider: Pcp Pt States None  Means of arrival: walk-in  History obtained from: patient  History limited by: none  CHIEF COMPLAINT  Chief Complaint   Patient presents with   • Sent from Urgent Care     Pt has neck mass x2 weeks, woke today and it had increased in size. Mild pain with touch and movement. Airway intact, able to handle own secretions. Pt reports hx of abscess.       HPI  Liz Rider is a 29 y.o. Female with a history of abscesses who presents to the ED from [facility] for evaluation of a left-sided neck mass onset 2 weeks ago. The patient reports that 2 weeks ago she noticed a small pea-sized mass in her neck. Today the mass has grown to the size of a baseball which prompted her visit to the ED. The patient reports associated mild neck pain which is exacerbated with touch and movement. She denies any associated dysphagia, sore throat, voice changes, dyspnea, ear pain, dental pain, fevers, chills, or sublingual edema. She reports a history of an abscess that needed to be surgically drained in January underneath her right breast. She denies any long-term medical diseases and does not take any regular medications. The patient also denies currently being pregnant.    REVIEW OF SYSTEMS  See HPI for further details.  Positive for lump in anterior neck area.  Negative for sore throat, voice changes, difficulty swallowing fluids or saliva, difficulty breathing, dental pain, fevers, chills, cough.  All other systems are negative.    PAST MEDICAL HISTORY  Past Medical History:   Diagnosis Date   • Psoriasis        FAMILY HISTORY  History reviewed. No pertinent family history.    SOCIAL HISTORY  Social History     Socioeconomic History   • Marital status: Single                                                                       Tobacco Use   • Smoking status: Former Smoker     Types:  "Cigarettes   • Smokeless tobacco: Never Used   • Tobacco comment: 3-4 daily   Substance and Sexual Activity   • Alcohol use: Yes     Comment: Social   • Drug use: No   • Sexual activity: Not Currently       SURGICAL HISTORY  Past Surgical History:   Procedure Laterality Date   • OPEN REDUCTION         CURRENT MEDICATIONS  Home Medications     Reviewed by Judith Toledo R.N. (Registered Nurse) on 09/03/19 at 1506  Med List Status: Complete   Medication Last Dose Status        Patient Kamaljit Taking any Medications                       ALLERGIES  No Known Allergies    PHYSICAL EXAM  VITAL SIGNS: /84   Pulse 71   Temp 35.9 °C (96.6 °F) (Temporal)   Resp 18   Ht 1.499 m (4' 11\")   Wt 88 kg (194 lb 0.1 oz)   SpO2 96%   Breastfeeding? No   BMI 39.18 kg/m²    Constitutional: Alert in no apparent distress.  HENT: 5x5 cm area of tenderness and firmness to the area between the upper larynx and the submental area which is located in the midline. No obvious fluctuance, erythema, drooling, stridor, or trismus. No sublingual swelling. No dental tenderness with percussion. Tonsils slightly enlarged but not erythematous. No exudates. Normocephalic, Bilateral external ears normal. Nose normal.   Eyes: Pupils are equal and reactive. Conjunctiva normal, non-icteric.   Thorax & Lungs: Lungs clear. Rare wheeze at right base. No respiratory distress. Easy unlabored respirations  Cardiovascular: Heart regular rate and rhythm. No murmurs.  Abdomen:  No gross signs of peritonitis, no pain with movement   Skin: Visualized skin is  Dry, No erythema, No rash.   Extremities:   No edema, no edema.  2+ pedal pulses equal bilaterally  Neurologic: Alert, clear speech  Psychiatric: Affect and Mood normal    LABS/RADIOLOGY/PROCEDURES  Results for orders placed or performed during the hospital encounter of 09/03/19   CBC WITH DIFFERENTIAL   Result Value Ref Range    WBC 12.2 (H) 4.8 - 10.8 K/uL    RBC 4.90 4.20 - 5.40 M/uL    " Hemoglobin 14.3 12.0 - 16.0 g/dL    Hematocrit 43.3 37.0 - 47.0 %    MCV 88.4 81.4 - 97.8 fL    MCH 29.2 27.0 - 33.0 pg    MCHC 33.0 (L) 33.6 - 35.0 g/dL    RDW 41.3 35.9 - 50.0 fL    Platelet Count 290 164 - 446 K/uL    MPV 9.2 9.0 - 12.9 fL    Neutrophils-Polys 76.50 (H) 44.00 - 72.00 %    Lymphocytes 14.40 (L) 22.00 - 41.00 %    Monocytes 6.50 0.00 - 13.40 %    Eosinophils 1.70 0.00 - 6.90 %    Basophils 0.50 0.00 - 1.80 %    Immature Granulocytes 0.40 0.00 - 0.90 %    Nucleated RBC 0.00 /100 WBC    Neutrophils (Absolute) 9.29 (H) 2.00 - 7.15 K/uL    Lymphs (Absolute) 1.75 1.00 - 4.80 K/uL    Monos (Absolute) 0.79 0.00 - 0.85 K/uL    Eos (Absolute) 0.21 0.00 - 0.51 K/uL    Baso (Absolute) 0.06 0.00 - 0.12 K/uL    Immature Granulocytes (abs) 0.05 0.00 - 0.11 K/uL    NRBC (Absolute) 0.00 K/uL   BASIC METABOLIC PANEL   Result Value Ref Range    Sodium 139 135 - 145 mmol/L    Potassium 4.1 3.6 - 5.5 mmol/L    Chloride 104 96 - 112 mmol/L    Co2 25 20 - 33 mmol/L    Glucose 89 65 - 99 mg/dL    Bun 9 8 - 22 mg/dL    Creatinine 0.74 0.50 - 1.40 mg/dL    Calcium 9.8 8.5 - 10.5 mg/dL    Anion Gap 10.0 0.0 - 11.9   HCG QUAL SERUM   Result Value Ref Range    Beta-Hcg Qualitative Serum Negative Negative   ESTIMATED GFR   Result Value Ref Range    GFR If African American >60 >60 mL/min/1.73 m 2    GFR If Non African American >60 >60 mL/min/1.73 m 2         CT-SOFT TISSUE NECK WITH   Final Result      1.  2.3 x 1.8 x 2.2 cm submental rim-enhancing fluid collection with internal septation. There is mild stranding inflammatory fat infiltration. Differential considerations include thyroglossal duct cyst with possible superimposed infection, or abscess.        COURSE & MEDICAL DECISION MAKING  Pertinent Labs & Imaging studies reviewed. (See chart for details)      3:45 PM - Patient seen and examined at bedside. Discussed plan of care, including a CT-scan of the patient's neck and a pregnancy test. Patient agrees to the plan of  care. Ordered for CT-Soft Tissue Neck, CBC Diff, BMP, and HCG Qual Serum to evaluate her symptoms.     1755: I spoke with Dr. Roman, ear nose and throat surgeon on call.  He will come in to the ER and attempt to drain the patient's abscess in the emergency department.  He requests 3 g of IV Unasyn in the meantime.  He requests I&D set up with a 16 blade scalpel, local anesthetic, and normal saline at bedside.    Patient presents to the ER complaining of neck swelling and pain which began today.  She states she noticed a marble size bump in her anterior left neck over the last 2 weeks.  She said it felt rubbery and it was mobile.  It was not particularly tender.  Today it suddenly increased in size.  It is now the size of a mandrin orange.  It is tender to palpation.  She has not had any trouble breathing.  No difficulty swallowing saliva.  No trismus.  No sublingual swelling.  No dental pain or tenderness on examination.  No voice changes.  She is not in any respiratory distress.  CT scan of the neck was performed with IV contrast as I was highly suspicious of an abscess or an infected neck cyst.  CT scan is positive for a 2 x 3 x 1 0.8 x 2.2 cm submental rim-enhancing fluid collection with internal septation consistent with an abscess.  It is just deep to the platysma.  I spoke with Dr. Roman, head and neck surgeon on-call.  He will kindly come down and see the patient in the emergency department.  He states he will try to drain the abscess in the emergency department if possible, otherwise if he thinks that the abscess is not accessible here in the ER then he will proceed accordingly and take patient to OR.    1955: Dr. Roman is at bedside.  He has aspirated the abscess here in the emergency department.  He states she is safe for discharge home to follow-up in the office.  He thinks she probably has an infected thyroglossal duct cyst.  He would like her to go home on Augmentin.  She is to call his office for an  appointment.    FINAL IMPRESSION  1. Abscess, neck Acute        This dictation has been created using voice recognition software. The accuracy of the dictation is limited by the abilities of the software. I expect there may be some errors of grammar and possibly content. I made every attempt to manually correct the errors within my dictation. However, errors related to voice recognition software may still exist and should be interpreted within the appropriate context.     I, Diego Fortune (Scribe), am scribing for, and in the presence of, Ariela Reaves M.D..    Electronically signed by: Diego Fortune (Fredibbibiana), 9/3/2019    I, Ariela Reaves M.D. personally performed the services described in this documentation, as scribed by Diego Fortune in my presence, and it is both accurate and complete.    C    The note accurately reflects work and decisions made by me.  Ariela Reaves  9/3/2019  5:57 PM

## 2019-09-03 NOTE — PROGRESS NOTES
"Subjective:   Pt is a 29 y.o. female who presents with neck mass.          HPI  This is a new problem. Pt notes new onset and growing anterior neck mass x 2 weeks with the size of tangerine and new onset redness to the skin. Pt denies difficulty swallowing or breathing. Pt notes pain with compression of the mass. Pt notes hx of multiple mouth abscesses as a child and axilla abscess last year but notes this is not coming from the skin, but \"underneath it\".  Pt has not taken any Rx medications for this condition. Pt states the pain is a 5/10, aching in nature and worse at night. Pt denies CP, SOB, NVD, paresthesias, headaches, dizziness, change in vision, hives, or other joint pain. The pt's medication list, problem list, and allergies have been evaluated and reviewed during today's visit.    PMH:  Past Medical History:   Diagnosis Date   • Psoriasis        PSH:  Past Surgical History:   Procedure Laterality Date   • OPEN REDUCTION         Fam Hx:  the patient's family history is not pertinent to their current complaint    Family Status   Relation Name Status   • Mo  Alive   • Fa  Alive       Soc HX:  Social History     Socioeconomic History   • Marital status: Single     Spouse name: Not on file   • Number of children: Not on file   • Years of education: Not on file   • Highest education level: Not on file   Occupational History   • Not on file   Social Needs   • Financial resource strain: Not on file   • Food insecurity:     Worry: Not on file     Inability: Not on file   • Transportation needs:     Medical: Not on file     Non-medical: Not on file   Tobacco Use   • Smoking status: Former Smoker     Types: Cigarettes   • Smokeless tobacco: Never Used   • Tobacco comment: 3-4 daily   Substance and Sexual Activity   • Alcohol use: Yes     Comment: Social   • Drug use: No   • Sexual activity: Not Currently   Lifestyle   • Physical activity:     Days per week: Not on file     Minutes per session: Not on file   • Stress: " "Not on file   Relationships   • Social connections:     Talks on phone: Not on file     Gets together: Not on file     Attends Worship service: Not on file     Active member of club or organization: Not on file     Attends meetings of clubs or organizations: Not on file     Relationship status: Not on file   • Intimate partner violence:     Fear of current or ex partner: Not on file     Emotionally abused: Not on file     Physically abused: Not on file     Forced sexual activity: Not on file   Other Topics Concern   • Not on file   Social History Narrative   • Not on file         Medications:    Current Outpatient Medications:   •  lidocaine viscous 2% (XYLOCAINE) 2 % Solution, Take 5 mL by mouth 3 times a day as needed for Throat/Mouth Pain., Disp: 100 mL, Rfl: 0  •  Levonorgestrel-Ethinyl Estrad (LUTERA PO), Take  by mouth., Disp: , Rfl:   •  TRIAMCINOLONE ACETONIDE, TOP, 0.05 % OINT, 1 Application by Apply externally route 2 Times a Day., Disp: 1 g, Rfl: 3      Allergies:  Patient has no known allergies.    ROS  Constitutional: Negative for fever, chills and malaise/fatigue.   HENT: Negative for congestion and sore throat.  +neck mass  Eyes: Negative for blurred vision, double vision and photophobia.   Respiratory: Negative for cough and shortness of breath.  Cardiovascular: Negative for chest pain and palpitations.   Gastrointestinal: Negative for heartburn, nausea, vomiting, abdominal pain, diarrhea and constipation.   Genitourinary: Negative for dysuria and flank pain.   Musculoskeletal: Negative for joint pain and myalgias.   Skin: Negative for itching and rash.   Neurological: Negative for dizziness, tingling and headaches.   Endo/Heme/Allergies: Does not bruise/bleed easily.   Psychiatric/Behavioral: Negative for depression. The patient is not nervous/anxious.           Objective:     /76   Pulse 87   Temp 37.3 °C (99.1 °F)   Resp 18   Ht 1.499 m (4' 11.02\")   Wt 86.2 kg (190 lb)   SpO2 98%   " BMI 38.35 kg/m²      Physical Exam   Neck: Trachea normal, normal range of motion, full passive range of motion without pain and phonation normal. Neck supple. Normal carotid pulses, no hepatojugular reflux and no JVD present. No tracheal tenderness, no spinous process tenderness and no muscular tenderness present. Carotid bruit is not present. No neck rigidity. No tracheal deviation, no edema, no erythema and normal range of motion present. No Brudzinski's sign and no Kernig's sign noted. Thyroid mass present.             Constitutional: PT is oriented to person, place, and time. PT appears well-developed and well-nourished. No distress.   HENT:   Head: Normocephalic and atraumatic.   Mouth/Throat: Oropharynx is clear and moist. No oropharyngeal exudate.   Eyes: Conjunctivae normal and EOM are normal. Pupils are equal, round, and reactive to light.   Cardiovascular: Normal rate, regular rhythm, normal heart sounds and intact distal pulses.  Exam reveals no gallop and no friction rub.    No murmur heard.  Pulmonary/Chest: Effort normal and breath sounds normal. No respiratory distress. PT has no wheezes. PT has no rales. Pt exhibits no tenderness.   Abdominal: Soft. Bowel sounds are normal. PT exhibits no distension and no mass. There is no tenderness. There is no rebound and no guarding.   Musculoskeletal: Normal range of motion. PT exhibits no edema and no tenderness.   Neurological: PT is alert and oriented to person, place, and time. PT has normal reflexes. No cranial nerve deficit.   Skin: Skin is warm and dry. No rash noted. PT is not diaphoretic. No erythema.       Psychiatric: PT has a normal mood and affect. PT behavior is normal. Judgment and thought content normal.          Assessment/Plan:     1. Neck mass    Impressive fast growing neck mass with concern for abscess due to fluctuance or possible thyroid involvement  Spoke with Dr. Love at [facility] and noted they do not have ENT oncall  and he recommended going to [facility]  TO [facility] ER NOW for further evaluation. Spoke with Spohie on the phone, charge nurse at the ER , and she graciously accepted transfer of care for further imaging and evaluation of the pt. Reiterated to patient that although a provider to provider transfer was made this will not necessarily expedite the ER process.  Pt to go POV to [facility] now  Rest, fluids encouraged.  AVS with medical info given.  Pt was in full understanding and agreement with the plan.  Differential diagnosis, natural history, supportive care, and indications for immediate follow-up discussed. All questions answered. Patient agrees with the plan of care.  Follow-up as needed if symptoms worsen or fail to improve.

## 2019-09-04 NOTE — PROCEDURES
DATE OF SERVICE:  09/03/2019    PREPROCEDURE DIAGNOSIS:  Infected thyroglossal duct cyst.    POSTPROCEDURE DIAGNOSIS:  Infected thyroglossal duct cyst.    PROCEDURE:  Needle aspiration, cyst contents.    PROVIDER:  Jose M Roman MD    INDICATIONS:  The patient is a 29-year-old female who has developed rather   acute swelling in her anterior neck.  CT scanning shows what appears to be a   thyroglossal duct cyst.  It is 2.7 cm in greatest dimension.  The patient will   undergo needle aspiration.    DESCRIPTION OF PROCEDURE:  Using alcohol pad, the skin was prepped.  A 1%   lidocaine with 1:100,000 epinephrine was then used to infiltrate the area   overlying the cyst down to it using approximately 3 mL.  The skin was   reprepped and an 18-gauge needle advanced into the cyst.  A 5 mL of mucoid and   somewhat purulent fluid was obtained.  There still was moderate mass   remaining and so a 16-gauge needle was then introduced as well, but no   additional fluid obtained.  Hemostasis was obtained with light pressure.  A   Band-Aid was then placed.  This was done in the emergency room at the   patient's bedside.       ____________________________________     MD ROSENDO CHIRINOSM / NTS    DD:  09/03/2019 20:02:48  DT:  09/03/2019 21:13:42    D#:  [id number]  Job#:  [id number]

## 2019-09-04 NOTE — ED NOTES
The patient has been provided with discharge education and information.  The patient was also provided with instructions on follow up care and return precautions.  The patient verbalizes understanding of discharge instructions, follow up care, and return precautions.  All questions have been answered.  One RX written by JOHN PAUL.  NAD, A/Ox4, good color and appropriate at time of discharge.  Patient ambulated steady gait out of department.

## 2019-09-04 NOTE — DISCHARGE INSTRUCTIONS
Follow-up with Dr. Roman as instructed.  Call tomorrow for your appointment time.    Return to the ER for any worsening neck pain, worsening neck swelling, for difficulty swallowing fluids or saliva, difficulty breathing, difficulty fully opening your mouth, swelling under your tongue, fevers over 100.4, shaking chills, nausea, vomiting, or for any concerns.

## 2019-09-04 NOTE — CONSULTS
DATE OF SERVICE:  09/03/2019    HISTORY OF PRESENT ILLNESS:  The patient is a 29-year-old female who noticed a   marble-sized swelling in her anterior neck approximately 2 weeks ago.  She   did fine and had virtually no pain.  It swelled up 8-10 fold today and she   presented to the emergency room.  CT scan was accomplished revealing a 2.7 cm   fluid collection in the region of the hyoid bone and thyroid cartilage just to   the left of midline.  I was asked to see the patient.    PREVIOUS MEDICAL HISTORY:  No history of significant medical illnesses.    PREVIOUS SURGICAL HISTORY:  1.  Open reduction and internal fixation of left lower leg fractures x2.  2.  Breast incision and drainage for abscess.    MEDICATIONS:  None.    ALLERGIES:  None.    HABITS:  1.  Tobacco:  None.  The patient quit in 11/2018.  2.  ETOH:  Occasional.  3.  Recreational drugs:  Marijuana at times.    REVIEW OF SYSTEMS:  Negative.    PHYSICAL EXAMINATION:  GENERAL:  Well-developed, well-nourished female in no acute distress.  HEENT:  Pinnae, external auditory canals and tympanic membranes are normal.    The nasal exam is within normal limits.  The oral exam demonstrates   healthy-appearing mucosa.  There is a tongue stud.  The tonsils are not   enlarged.  The mucous membranes are healthy.  There is no floor of mouth   swelling and no trismus.  NECK:  Shows a 3 cm anterior neck mass just above the thyroid notch.    IMAGING:  Review of the CT scan demonstrates a completely round and uniform   fluid collection just to the left of midline at the thyrohyoid membrane in   between the thyroid cartilage and the hyoid bone.    LABORATORY DATA:  White blood count is 12.2 with 76 neutrophils and 14   lymphocytes.    IMPRESSION:  Infected thyroglossal duct cyst.    PLAN:  1.  The cyst was aspirated at the bedside.  A 5 mL of mucoid and somewhat   purulent fluid obtained.  2.  The patient has already received 3 g of Unasyn.  3.  I do believe the patient  can be discharged on Augmentin 875 mg 1 p.o.   b.i.d. for 10 days.  4.  I gave her my card.  I will need to see her and schedule excision of this   lesion.  5.  She was told that this may not be adequate treatment and she may yet   require formal incision and drainage and admission to the hospital.       ____________________________________     MD AURELIA CHIRINOS / ABDULKADIR    DD:  09/03/2019 20:00:19  DT:  09/03/2019 21:07:30    D#:  [id number]  Job#:  [id number]

## 2019-09-08 LAB
BACTERIA BLD CULT: NORMAL
BACTERIA BLD CULT: NORMAL
SIGNIFICANT IND 70042: NORMAL
SIGNIFICANT IND 70042: NORMAL
SITE SITE: NORMAL
SITE SITE: NORMAL
SOURCE SOURCE: NORMAL
SOURCE SOURCE: NORMAL

## 2019-09-10 NOTE — ED NOTES
"ED Positive Culture Follow-up/Notification Note:    Date: 9/10/19     Patient seen in the ED on 9/3/2019 for left sided neck mass x 2 weeks.   1. Abscess, neck Acute      Discharge Medication List as of 9/3/2019  8:23 PM      START taking these medications    Details   amoxicillin-clavulanate (AUGMENTIN) 875-125 MG Tab Take 1 Tab by mouth 2 times a day for 10 days., Disp-20 Tab, R-0, Print Rx Paper             Allergies: Patient has no known allergies.     Vitals:    09/03/19 1445 09/03/19 1503 09/03/19 2016   BP: 121/84  115/61   Pulse: 71  66   Resp: 18  16   Temp: 35.9 °C (96.6 °F)     TempSrc: Temporal     SpO2: 96%  94%   Weight:  88 kg (194 lb 0.1 oz)    Height: 1.499 m (4' 11\")         Final cultures:   Results     Procedure Component Value Units Date/Time    BLOOD CULTURE [[id number]] Collected:  09/03/19 1800    Order Status:  Completed Specimen:  Blood from Peripheral Updated:  09/08/19 1900     Significant Indicator NEG     Source BLD     Site PERIPHERAL     Culture Result No growth after 5 days of incubation.    Narrative:       Per Hospital Policy: Only change Specimen Src: to \"Line\" if  specified by physician order.  No site indicated    BLOOD CULTURE [[id number]] Collected:  09/03/19 1820    Order Status:  Completed Specimen:  Blood from Peripheral Updated:  09/08/19 1900     Significant Indicator NEG     Source BLD     Site PERIPHERAL     Culture Result No growth after 5 days of incubation.    Narrative:       Per Hospital Policy: Only change Specimen Src: to \"Line\" if  specified by physician order.  No site indicated    FLUID CULTURE W/GRAM STAIN [[id number]]  (Abnormal) Collected:  09/03/19 2008    Order Status:  Completed Specimen:  Body Fluid from Neck Updated:  09/08/19 0901     Significant Indicator POS     Source BF     Site thyroglossal duct cyst     Culture Result Growth noted after further incubation, see below for  organism identification.       Gram Stain Result Many WBCs.  No organisms " seen.       Culture Result Gram negative coccobacillus  Rare growth  Sent to Reference Laboratory for further identification.      Narrative:       Infected thyroglossal duct cyst,  For gram stain, aerobes and  anaerobes.  Infected thyroglossal duct cyst,  For gram stain, aerobes and    GRAM STAIN [[id number]] Collected:  09/03/19 2008    Order Status:  Completed Specimen:  Body Fluid Updated:  09/03/19 9723     Significant Indicator .     Source BF     Site thyroglossal duct cyst     Gram Stain Result Many WBCs.  No organisms seen.      Narrative:       Infected thyroglossal duct cyst,  For gram stain, aerobes and  anaerobes.  Infected thyroglossal duct cyst,  For gram stain, aerobes and          Plan:   Appropriate antibiotic therapy prescribed empirically to cover gram negative coccobacilli. However, will await final cultures for further determination.     Sophie Trotter

## 2019-09-14 LAB
BACTERIA FLD AEROBE CULT: ABNORMAL
BACTERIA FLD AEROBE CULT: ABNORMAL
GRAM STN SPEC: ABNORMAL
SIGNIFICANT IND 70042: ABNORMAL
SITE SITE: ABNORMAL
SOURCE SOURCE: ABNORMAL
TEST NAME 95000: NORMAL

## 2019-09-15 NOTE — ED NOTES
"ED Positive Culture Follow-up/Notification Note:   Date: 9/15    Patient seen in the ED on 9/3/2019 for anterior neck swelling s/p aspiration of cyst.   1. Abscess, neck Acute     Discharge Medication List as of 9/3/2019  8:23 PM      START taking these medications    Details   amoxicillin-clavulanate (AUGMENTIN) 875-125 MG Tab Take 1 Tab by mouth 2 times a day for 10 days., Disp-20 Tab, R-0, Print Rx Paper           Allergies: Patient has no known allergies.    Vitals:    09/03/19 1445 09/03/19 1503 09/03/19 2016   BP: 121/84  115/61   Pulse: 71  66   Resp: 18  16   Temp: 35.9 °C (96.6 °F)     TempSrc: Temporal     SpO2: 96%  94%   Weight:  88 kg (194 lb 0.1 oz)    Height: 1.499 m (4' 11\")         Final cultures:   Results     Procedure Component Value Units Date/Time    FLUID CULTURE W/GRAM STAIN [[id number]]  (Abnormal) Collected:  09/03/19 2008    Order Status:  Completed Specimen:  Body Fluid from Neck Updated:  09/14/19 1649     Significant Indicator POS     Source BF     Site thyroglossal duct cyst     Culture Result Growth noted after further incubation, see below for  organism identification.       Gram Stain Result Many WBCs.  No organisms seen.       Culture Result Neisseria species  Rare growth  Performed by [facility]      Narrative:       Previous comment was modified by JULIA at 09:26 on 09/08/19.  Aerobic Organism Identification with Reflex to Susceptibility [id number]  [facility]. Actively growing organism, in pure culture. Source: Left Ankle  Tissue on Blood [place]. Gram negative coccobacillus. Ambient  ?Infected thyroglossal duct cyst,  For gram stain, aerobes and  ?anaerobes.  Aerobic Organism Identification with Reflex to Susceptibility [id number]  [facility]. Actively growing organism, in pure culture. Source: thyroglossal  duct cyst on [place]. Gram negative coccobacillus.  Ambient  Infected thyroglossal duct cyst,  For gram stain, aerobes and    BLOOD CULTURE [[id number]] Collected:  " "09/03/19 1800    Order Status:  Completed Specimen:  Blood from Peripheral Updated:  09/08/19 1900     Significant Indicator NEG     Source BLD     Site PERIPHERAL     Culture Result No growth after 5 days of incubation.    Narrative:       Per Hospital Policy: Only change Specimen Src: to \"Line\" if  specified by physician order.  No site indicated    BLOOD CULTURE [[id number]] Collected:  09/03/19 1820    Order Status:  Completed Specimen:  Blood from Peripheral Updated:  09/08/19 1900     Significant Indicator NEG     Source BLD     Site PERIPHERAL     Culture Result No growth after 5 days of incubation.    Narrative:       Per Hospital Policy: Only change Specimen Src: to \"Line\" if  specified by physician order.  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 No changes required based upon culture result.    Called patient to notify of positive culture result and encourage compliance with prescribed antibiotics.    Viviana Rosas, PharmD      "

## 2020-09-28 NOTE — ED NOTES
1915: Received report from Haritha JAY.    Patient resting comfortably at bedside, denies any needs.       1930: Dr. Roman at bedside, friend at bedside.    Cheek Interpolation Flap Text: A decision was made to reconstruct the defect utilizing an interpolation axial flap and a staged reconstruction.  A telfa template was made of the defect.  This telfa template was then used to outline the Cheek Interpolation flap.  The donor area for the pedicle flap was then injected with anesthesia.  The flap was excised through the skin and subcutaneous tissue down to the layer of the underlying musculature.  The interpolation flap was carefully excised within this deep plane to maintain its blood supply.  The edges of the donor site were undermined.   The donor site was closed in a primary fashion.  The pedicle was then rotated into position and sutured.  Once the tube was sutured into place, adequate blood supply was confirmed with blanching and refill.  The pedicle was then wrapped with xeroform gauze and dressed appropriately with a telfa and gauze bandage to ensure continued blood supply and protect the attached pedicle.